# Patient Record
Sex: FEMALE | Race: WHITE | NOT HISPANIC OR LATINO | Employment: UNEMPLOYED | URBAN - METROPOLITAN AREA
[De-identification: names, ages, dates, MRNs, and addresses within clinical notes are randomized per-mention and may not be internally consistent; named-entity substitution may affect disease eponyms.]

---

## 2024-04-30 ENCOUNTER — OFFICE VISIT (OUTPATIENT)
Age: 39
End: 2024-04-30
Payer: COMMERCIAL

## 2024-04-30 VITALS
WEIGHT: 127 LBS | SYSTOLIC BLOOD PRESSURE: 112 MMHG | BODY MASS INDEX: 23.37 KG/M2 | HEIGHT: 62 IN | DIASTOLIC BLOOD PRESSURE: 72 MMHG

## 2024-04-30 DIAGNOSIS — I87.2 VENOUS (PERIPHERAL) INSUFFICIENCY: ICD-10-CM

## 2024-04-30 DIAGNOSIS — I83.813 VARICOSE VEINS OF BILATERAL LOWER EXTREMITIES WITH PAIN: Primary | ICD-10-CM

## 2024-04-30 PROCEDURE — 99203 OFFICE O/P NEW LOW 30 MIN: CPT | Performed by: SURGERY

## 2024-04-30 NOTE — PROGRESS NOTES
"Chief Complaint  New Patient  (Bilateral varicose veins with swelling, pain and aching. )    Subjective        Mary Domingo presents to [facility]  History of Present Illness patient is a 38-year-old female with varicose veins in both lower extremities.  She has had 5 pregnancies and these developed in an earlier pregnancy and worsened after each additional 1.  No history of DVT or superficial thrombophlebitis.  No history compatible with arterial claudication.  She exercises regularly with no problems and no worsening of her varices but increased pain immediately after she finishes exercising.  No previous venous intervention.  There is a family history of varicose veins in her mother, grandmother, and multiple aunts.  Past History:  Medical History: has no past medical history on file.   Surgical History: has no past surgical history on file.   Family History: family history is not on file.   Social History:     (Not in a hospital admission)     Allergies: Patient has no known allergies.   Objective   Vital Signs:  /72   Ht 157.5 cm (62\")   Wt 57.6 kg (127 lb)   BMI 23.23 kg/m²   Estimated body mass index is 23.23 kg/m² as calculated from the following:    Height as of this encounter: 157.5 cm (62\").    Weight as of this encounter: 57.6 kg (127 lb).     BMI is within normal parameters. No other follow-up for BMI required.    Mary Domingo has never used tobacco products.        Physical Exam varicose veins noted in the medial calf region bilaterally as large as 1-1/2 cm in diameter.  They appear to be originating off of the greater saphenous veins.  On the right there reticular veins crossing the patella to the lateral knee region about 8 to 9 mm in diameter.  No edema noted.  Good pulses noted in both lower extremities.  Result Review :                     Assessment and Plan     Diagnoses and all orders for this visit:    1. Varicose veins of bilateral lower " extremities with pain (Primary)  -     Venous w Reflux Lower Extremity - Bilateral CAR; Future    2. Venous (peripheral) insufficiency    The pathophysiology of venous insufficiency and varicose veins were discussed in detail with the patient.  Treatment options were covered.  She will be placed in compression stockings of 20 to 30 mmHg.  Will obtain a venous scan with reflux and mapping.  Further recommendations to follow the testing.  I briefly discussed the possibility of venous ablations as a treatment for her if indicated with significant superficial insufficiency on her venous scan.  She understands if there is a combination of deep venous insufficiency and superficial insufficiency it may also include long-term compression stocking wear for the deep system management.         Follow Up     Return in about 4 weeks (around 5/28/2024) for After venous scan with reflux and mapping.  Patient was given instructions and counseling regarding her condition or for health maintenance advice. Please see specific information pulled into the AVS if appropriate.

## 2024-08-28 NOTE — PROGRESS NOTES
"Chief Complaint  No chief complaint on file.    Subjective        Mary Doimngo presents to [facility]ASC[facility]  History of Present Illness  The patient is a 38-year-old female with varicose veins in both lower extremities.  She has had 5 pregnancies and these developed in her initial pregnancy and worsened after each additional one.  No history of DVT or superficial thrombophlebitis.  No history compatible with arterial claudication.  She exercises regularly with no problems and no worsening of her varices but increased pain immediately after she finishes exercising.  The discomfort has an effect on activities of daily living.  No previous venous intervention.  There is a family history of varicose veins in her mother, grandmother, and multiple aunts.  She was placed in compression stockings of 20 to 30 mmHg and has not seen any significant benefit with this.  She returned on September 3, 2024 with a venous scan with reflux and mapping.  This demonstrated significant reflux at the saphenofemoral junction and involving the greater saphenous veins, anterior accessory saphenous vein, and short saphenous veins bilaterally.    Past History:  Medical History: has no past medical history on file.   Surgical History: has no past surgical history on file.   Family History: family history is not on file.   Social History:     (Not in a hospital admission)     Allergies: Patient has no known allergies.   Objective   Vital Signs:  There were no vitals taken for this visit.  Estimated body mass index is 23.23 kg/m² as calculated from the following:    Height as of 4/30/24: 157.5 cm (62\").    Weight as of 4/30/24: 57.6 kg (127 lb).     BMI is within normal parameters. No other follow-up for BMI required.    Mary Domingo has never smoked and has never used smokeless tobacco.        Physical Exam   varicose veins noted in the medial calf region bilaterally as large as 1-1/2 cm in diameter. They appear to " be originating off of the greater saphenous veins. On the right there reticular veins crossing the patella to the lateral knee region about 8 to 9 mm in diameter. No edema noted. Good pulses noted in both lower extremities.   Result Review :        Data reviewed : Venous scan of reflux and mapping from September 3, 2024, findings as above.             Assessment and Plan     Diagnoses and all orders for this visit:    1. Varicose veins of bilateral lower extremities with pain (Primary)    2. Venous (peripheral) insufficiency    The patient has significant reflux involving the greater saphenous veins, anterior accessory saphenous veins, and short saphenous veins with varices off all of these structures.  I believe if we were to perform ablations of all of these vessels bilaterally that she would have significant improvement in her pain but also decompression of her varices.  The procedure and its risk and benefits have been discussed in detail with her.  She is in agreement with the plan.  We will contact insurance for approval.         Follow Up     No follow-ups on file.  Patient was given instructions and counseling regarding her condition or for health maintenance advice. Please see specific information pulled into the AVS if appropriate.

## 2024-09-03 ENCOUNTER — HOSPITAL ENCOUNTER (OUTPATIENT)
Facility: HOSPITAL | Age: 39
Discharge: HOME | End: 2024-09-03
Admitting: SURGERY
Payer: COMMERCIAL

## 2024-09-03 ENCOUNTER — OFFICE VISIT (OUTPATIENT)
Age: 39
End: 2024-09-03
Payer: COMMERCIAL

## 2024-09-03 VITALS — HEIGHT: 62 IN | WEIGHT: 127 LBS | BODY MASS INDEX: 23.37 KG/M2

## 2024-09-03 DIAGNOSIS — I83.813 VARICOSE VEINS OF BILATERAL LOWER EXTREMITIES WITH PAIN: Primary | ICD-10-CM

## 2024-09-03 DIAGNOSIS — I83.813 VARICOSE VEINS OF BILATERAL LOWER EXTREMITIES WITH PAIN: ICD-10-CM

## 2024-09-03 DIAGNOSIS — I87.2 VENOUS (PERIPHERAL) INSUFFICIENCY: ICD-10-CM

## 2024-09-03 LAB
BH CV LEFT LOWER VAS AA GSV REFLUX TIME: 4.82 SEC
BH CV LEFT LOWER VAS AA GSV TRANS DIAMETER: 0.52 CM
BH CV LEFT LOWER VAS EXT ILIAC REFLUX COLOR FLOW TIME: 1.4 SEC
BH CV LEFT LOWER VAS GSV KNEE REFLUX TIME: 8.83 SEC
BH CV LEFT LOWER VAS GSV KNEE TRANSVERSE DIAMETER: 0.89 CM
BH CV LEFT LOWER VAS GSV MID CALF TRANS DIAMETER: 0.42 CM
BH CV LEFT LOWER VAS GSV MID TRANSVERSE DIAMETER: 0.47 CM
BH CV LEFT LOWER VAS GSV PROX REFLUX TIME: 3.3 SEC
BH CV LEFT LOWER VAS GSV PROX TRANSVERSE DIAMETER: 0.81 CM
BH CV LEFT LOWER VAS GSVBELOW KNEE TRANSVERSE DIAMETER: 0.54 CM
BH CV LEFT LOWER VAS MID FEMORAL REFLUX TIME: 1.51 SEC
BH CV LEFT LOWER VAS POPLITEAL REFLUX TIME: 1.01 SEC
BH CV LEFT LOWER VAS SAPHENOFEMORAL JUNCTION REFLUX TIME: 2.38 SEC
BH CV LEFT LOWER VAS SAPHENOFEMORAL JUNCTION TRANSVERSE DIAMETER: 0.98 CM
BH CV LEFT LOWER VAS SPJ TRANS DIAMETER: 0.56 CM
BH CV LEFT LOWER VAS SSV MID CALF REFLUX TIME: 11.6 SEC
BH CV LEFT LOWER VAS SSV MID CALF TRANS DIAMETER: 0.5 CM
BH CV LEFT LOWER VAS SSV PROX CALF TRANS DIAMETER: 0.63 CM
BH CV LOW VAS LEFT EXTERNAL ILIAC AUGMENT: NORMAL
BH CV LOW VAS LEFT EXTERNAL ILIAC COMPRESS: NORMAL
BH CV LOWER VAS LEFT GSV DIST THIGH COMPRESSIBILTY: NORMAL
BH CV LOWER VAS LEFT GSV MID CALF COMPRESSIBILTY: NORMAL
BH CV LOWER VAS LEFT GSV MID THIGH COMPRESSIBILTY: NORMAL
BH CV LOWER VAS RIGHT GSV DIST THIGH COMPRESSIBILTY: NORMAL
BH CV LOWER VAS RIGHT GSV MID CALF COMPRESSIBILTY: NORMAL
BH CV LOWER VAS RIGHT GSV MID THIGH COMPRESSIBILTY: NORMAL
BH CV LOWER VASCULAR LEFT AA GSV COMPETENT: NORMAL
BH CV LOWER VASCULAR LEFT AA GSV COMPRESS: NORMAL
BH CV LOWER VASCULAR LEFT COMMON FEMORAL AUGMENT: NORMAL
BH CV LOWER VASCULAR LEFT COMMON FEMORAL COMPETENT: NORMAL
BH CV LOWER VASCULAR LEFT COMMON FEMORAL COMPRESS: NORMAL
BH CV LOWER VASCULAR LEFT COMMON FEMORAL PHASIC: NORMAL
BH CV LOWER VASCULAR LEFT COMMON FEMORAL SPONT: NORMAL
BH CV LOWER VASCULAR LEFT DISTAL FEMORAL COMPRESS: NORMAL
BH CV LOWER VASCULAR LEFT EXTERNAL ILIAC COMPETENT: NORMAL
BH CV LOWER VASCULAR LEFT EXTERNAL ILIAC PHASIC: NORMAL
BH CV LOWER VASCULAR LEFT EXTERNAL ILIAC SPONT: NORMAL
BH CV LOWER VASCULAR LEFT GASTRONEMIUS COMPRESS: NORMAL
BH CV LOWER VASCULAR LEFT GREATER SAPH AK COMPETENT: NORMAL
BH CV LOWER VASCULAR LEFT GREATER SAPH AK COMPRESS: NORMAL
BH CV LOWER VASCULAR LEFT GREATER SAPH BK COMPRESS: NORMAL
BH CV LOWER VASCULAR LEFT GSV DIST THIGH COMPETENT: NORMAL
BH CV LOWER VASCULAR LEFT LESSER SAPH COMPETENT: NORMAL
BH CV LOWER VASCULAR LEFT LESSER SAPH COMPRESS: NORMAL
BH CV LOWER VASCULAR LEFT MID FEMORAL AUGMENT: NORMAL
BH CV LOWER VASCULAR LEFT MID FEMORAL COMPETENT: NORMAL
BH CV LOWER VASCULAR LEFT MID FEMORAL COMPRESS: NORMAL
BH CV LOWER VASCULAR LEFT MID FEMORAL PHASIC: NORMAL
BH CV LOWER VASCULAR LEFT MID FEMORAL SPONT: NORMAL
BH CV LOWER VASCULAR LEFT PERONEAL COMPRESS: NORMAL
BH CV LOWER VASCULAR LEFT POPLITEAL AUGMENT: NORMAL
BH CV LOWER VASCULAR LEFT POPLITEAL COMPETENT: NORMAL
BH CV LOWER VASCULAR LEFT POPLITEAL COMPRESS: NORMAL
BH CV LOWER VASCULAR LEFT POPLITEAL PHASIC: NORMAL
BH CV LOWER VASCULAR LEFT POPLITEAL SPONT: NORMAL
BH CV LOWER VASCULAR LEFT POSTERIOR TIBIAL COMPRESS: NORMAL
BH CV LOWER VASCULAR LEFT PROFUNDA FEMORAL COMPRESS: NORMAL
BH CV LOWER VASCULAR LEFT PROXIMAL FEMORAL COMPRESS: NORMAL
BH CV LOWER VASCULAR LEFT SAPHENOFEMORAL JUNCTION AUGMENT: NORMAL
BH CV LOWER VASCULAR LEFT SAPHENOFEMORAL JUNCTION COMPETENT: NORMAL
BH CV LOWER VASCULAR LEFT SAPHENOFEMORAL JUNCTION COMPRESS: NORMAL
BH CV LOWER VASCULAR LEFT SAPHENOFEMORAL JUNCTION PHASIC: NORMAL
BH CV LOWER VASCULAR LEFT SAPHENOFEMORAL JUNCTION SPONT: NORMAL
BH CV LOWER VASCULAR LEFT SAPHENOPOP JX AUGMENT: NORMAL
BH CV LOWER VASCULAR LEFT SAPHENOPOP JX COMPETENT: NORMAL
BH CV LOWER VASCULAR LEFT SAPHENOPOP JX COMPRESS: NORMAL
BH CV LOWER VASCULAR LEFT SAPHENOPOP JX PHASIC: NORMAL
BH CV LOWER VASCULAR LEFT SAPHENOPOP JX SPONT: NORMAL
BH CV LOWER VASCULAR LEFT SOLEAL COMPRESS: NORMAL
BH CV LOWER VASCULAR LEFT SSV MID CALF COMPETENT: NORMAL
BH CV LOWER VASCULAR LEFT SSV MID CALF COMPRESS: NORMAL
BH CV LOWER VASCULAR RIGHT AA GSV COMPETENT: NORMAL
BH CV LOWER VASCULAR RIGHT AA GSV COMPRESS: NORMAL
BH CV LOWER VASCULAR RIGHT COMMON FEMORAL AUGMENT: NORMAL
BH CV LOWER VASCULAR RIGHT COMMON FEMORAL COMPETENT: NORMAL
BH CV LOWER VASCULAR RIGHT COMMON FEMORAL COMPRESS: NORMAL
BH CV LOWER VASCULAR RIGHT COMMON FEMORAL PHASIC: NORMAL
BH CV LOWER VASCULAR RIGHT COMMON FEMORAL SPONT: NORMAL
BH CV LOWER VASCULAR RIGHT DISTAL FEMORAL COMPRESS: NORMAL
BH CV LOWER VASCULAR RIGHT EXTERNAL ILIAC AUGMENT: NORMAL
BH CV LOWER VASCULAR RIGHT EXTERNAL ILIAC COMPETENT: NORMAL
BH CV LOWER VASCULAR RIGHT EXTERNAL ILIAC COMPRESS: NORMAL
BH CV LOWER VASCULAR RIGHT EXTERNAL ILIAC PHASIC: NORMAL
BH CV LOWER VASCULAR RIGHT EXTERNAL ILIAC SPONT: NORMAL
BH CV LOWER VASCULAR RIGHT GASTRONEMIUS COMPRESS: NORMAL
BH CV LOWER VASCULAR RIGHT GREATER SAPH AK COMPETENT: NORMAL
BH CV LOWER VASCULAR RIGHT GREATER SAPH BK COMPRESS: NORMAL
BH CV LOWER VASCULAR RIGHT GSV DIST THIGH COMPETENT: NORMAL
BH CV LOWER VASCULAR RIGHT LESSER SAPH COMPETENT: NORMAL
BH CV LOWER VASCULAR RIGHT LESSER SAPH COMPRESS: NORMAL
BH CV LOWER VASCULAR RIGHT MID FEMORAL AUGMENT: NORMAL
BH CV LOWER VASCULAR RIGHT MID FEMORAL COMPETENT: NORMAL
BH CV LOWER VASCULAR RIGHT MID FEMORAL COMPRESS: NORMAL
BH CV LOWER VASCULAR RIGHT MID FEMORAL PHASIC: NORMAL
BH CV LOWER VASCULAR RIGHT MID FEMORAL SPONT: NORMAL
BH CV LOWER VASCULAR RIGHT PERONEAL COMPRESS: NORMAL
BH CV LOWER VASCULAR RIGHT POPLITEAL AUGMENT: NORMAL
BH CV LOWER VASCULAR RIGHT POPLITEAL COMPETENT: NORMAL
BH CV LOWER VASCULAR RIGHT POPLITEAL COMPRESS: NORMAL
BH CV LOWER VASCULAR RIGHT POPLITEAL PHASIC: NORMAL
BH CV LOWER VASCULAR RIGHT POPLITEAL SPONT: NORMAL
BH CV LOWER VASCULAR RIGHT POSTERIOR TIBIAL COMPRESS: NORMAL
BH CV LOWER VASCULAR RIGHT PROFUNDA FEMORAL COMPRESS: NORMAL
BH CV LOWER VASCULAR RIGHT PROXIMAL FEMORAL COMPRESS: NORMAL
BH CV LOWER VASCULAR RIGHT SAPHENOFEMORAL JUNCTION AUGMENT: NORMAL
BH CV LOWER VASCULAR RIGHT SAPHENOFEMORAL JUNCTION COMPETENT: NORMAL
BH CV LOWER VASCULAR RIGHT SAPHENOFEMORAL JUNCTION COMPRESS: NORMAL
BH CV LOWER VASCULAR RIGHT SAPHENOFEMORAL JUNCTION PHASIC: NORMAL
BH CV LOWER VASCULAR RIGHT SAPHENOFEMORAL JUNCTION SPONT: NORMAL
BH CV LOWER VASCULAR RIGHT SAPHENOPOP JX AUGMENT: NORMAL
BH CV LOWER VASCULAR RIGHT SAPHENOPOP JX COMPETENT: NORMAL
BH CV LOWER VASCULAR RIGHT SAPHENOPOP JX COMPRESS: NORMAL
BH CV LOWER VASCULAR RIGHT SAPHENOPOP JX PHASIC: NORMAL
BH CV LOWER VASCULAR RIGHT SAPHENOPOP JX SPONT: NORMAL
BH CV LOWER VASCULAR RIGHT SOLEAL COMPRESS: NORMAL
BH CV LOWER VASCULAR RIGHT SSV MID CALF COMPETENT: NORMAL
BH CV LOWER VASCULAR RIGHT SSV MID CALF COMPRESS: NORMAL
BH CV RIGHT LOWER VAS AA GSV REFLUX TIME: 0.54 SEC
BH CV RIGHT LOWER VAS AA GSV TRANS DIAMETER: 0.51 CM
BH CV RIGHT LOWER VAS COMMON FEMORAL REFLUX COLOR FLOW TIME: 5.23 SEC
BH CV RIGHT LOWER VAS EXT ILIAC REFLUX COLOR FLOW TIME: 9.37 SEC
BH CV RIGHT LOWER VAS GSV KNEE REFLUX TIME: 2.95 SEC
BH CV RIGHT LOWER VAS GSV KNEE TRANS DIAMETER: 1.09 CM
BH CV RIGHT LOWER VAS GSV MID CALF TRANS DIAMETER: 0.28 CM
BH CV RIGHT LOWER VAS GSV MID THIGH TRANS DIAMETER: 0.69 CM
BH CV RIGHT LOWER VAS GSV PROX CALF TRANS DIAMETER: 0.67 CM
BH CV RIGHT LOWER VAS GSV PROX THIGH REFLUX TIME: 9.67 SEC
BH CV RIGHT LOWER VAS GSV PROX THIGH TRANS DIAMETER: 0.97 CM
BH CV RIGHT LOWER VAS MID FEMORAL REFLUX TIME: 1.32 SEC
BH CV RIGHT LOWER VAS SAPHENOFEM JUNCTION REFLUX TIME: 7.26 SEC
BH CV RIGHT LOWER VAS SAPHENOFEM JUNCTION TRANSVERSE DIAMETER: 0.8 CM
BH CV RIGHT LOWER VAS SPJ TRANS DIAMETER: 0.57 CM
BH CV RIGHT LOWER VAS SSV MID CALF REFLUX TIME: 10.81 SEC
BH CV RIGHT LOWER VAS SSV MID CALF TRANS DIAMETER: 0.53 CM
BH CV RIGHT LOWER VAS SSV PROX CALF TRANS DIAMETER: 0.52 CM
BH CV VAS RIGHT GSV PROXIMAL HIDDEN LRR COMPRESSIBILTY: NORMAL

## 2024-09-03 PROCEDURE — 99214 OFFICE O/P EST MOD 30 MIN: CPT | Performed by: SURGERY

## 2024-09-03 PROCEDURE — 93970 EXTREMITY STUDY: CPT | Performed by: SURGERY

## 2024-09-03 PROCEDURE — 93970 EXTREMITY STUDY: CPT

## 2024-12-17 ENCOUNTER — HOSPITAL ENCOUNTER (OUTPATIENT)
Facility: HOSPITAL | Age: 39
Discharge: HOME | End: 2024-12-17
Admitting: SURGERY
Payer: COMMERCIAL

## 2024-12-17 ENCOUNTER — OFFICE VISIT (OUTPATIENT)
Age: 39
End: 2024-12-17
Payer: COMMERCIAL

## 2024-12-17 VITALS
HEART RATE: 90 BPM | DIASTOLIC BLOOD PRESSURE: 115 MMHG | WEIGHT: 135 LBS | HEIGHT: 62 IN | SYSTOLIC BLOOD PRESSURE: 205 MMHG | BODY MASS INDEX: 24.84 KG/M2

## 2024-12-17 DIAGNOSIS — I87.2 VENOUS (PERIPHERAL) INSUFFICIENCY: ICD-10-CM

## 2024-12-17 DIAGNOSIS — I87.2 VENOUS (PERIPHERAL) INSUFFICIENCY: Primary | ICD-10-CM

## 2024-12-17 DIAGNOSIS — I83.813 VARICOSE VEINS OF BILATERAL LOWER EXTREMITIES WITH PAIN: ICD-10-CM

## 2024-12-17 DIAGNOSIS — R03.0 ELEVATED BLOOD PRESSURE READING: ICD-10-CM

## 2024-12-17 LAB
BH CV LOW VAS RIGHT ANTERIOR SAPH VESSEL SCRIPT: 1
BH CV LOW VAS RIGHT GREAT SAPH AK CM FIELD: 0.81 CM
BH CV LOW VAS RIGHT GREATER SAPH AK VESSEL: 1
BH CV LOW VAS RIGHT GREATER SAPH BK VESSEL: 1
BH CV LOW VAS RIGHT LESSER SAPH VESSEL: 1
BH CV LOWER VASCULAR RIGHT ANTERIOR SAPH COMPRESS: NORMAL
BH CV LOWER VASCULAR RIGHT COMMON FEMORAL COMPRESS: NORMAL
BH CV LOWER VASCULAR RIGHT DISTAL FEMORAL COMPRESS: NORMAL
BH CV LOWER VASCULAR RIGHT EXTERNAL ILIAC COMPRESS: NORMAL
BH CV LOWER VASCULAR RIGHT GASTRONEMIUS COMPRESS: NORMAL
BH CV LOWER VASCULAR RIGHT GREATER SAPH AK COMPRESS: NORMAL
BH CV LOWER VASCULAR RIGHT GREATER SAPH BK COMPRESS: NORMAL
BH CV LOWER VASCULAR RIGHT LESSER SAPH COMPRESS: NORMAL
BH CV LOWER VASCULAR RIGHT MID FEMORAL COMPRESS: NORMAL
BH CV LOWER VASCULAR RIGHT PERONEAL COMPRESS: NORMAL
BH CV LOWER VASCULAR RIGHT POPLITEAL COMPRESS: NORMAL
BH CV LOWER VASCULAR RIGHT POSTERIOR TIBIAL COMPRESS: NORMAL
BH CV LOWER VASCULAR RIGHT PROFUNDA FEMORAL COMPRESS: NORMAL
BH CV LOWER VASCULAR RIGHT PROXIMAL FEMORAL COMPRESS: NORMAL
BH CV LOWER VASCULAR RIGHT SAPHENOFEMORAL JUNCTION COMPRESS: NORMAL

## 2024-12-17 PROCEDURE — 93971 EXTREMITY STUDY: CPT

## 2024-12-17 PROCEDURE — 99213 OFFICE O/P EST LOW 20 MIN: CPT | Performed by: NURSE PRACTITIONER

## 2024-12-17 NOTE — PROGRESS NOTES
Chief Complaint  Post-op Follow-up    Subjective        Mary Domingo presents to [facility] VASC[facility] SURGERY  HPI   Mary Domingo is a 39 y.o. female that has been followed in our office for venous insufficiency and varicose veins. She under went a right leg endovenous laser ablation on 12/12/2024 with Dr. Villafuerte.  She is having her contralateral leg done on 12/20/2024.  She returns today in follow up along with a spot check. She has been doing well since the procedure with minimal pain. She has been compliant with compression stockings.  Today, her blood pressure is elevated.  She reports she gets very nervous in clinical settings.    Mary Domingo  reports that she has never smoked. She has never used smokeless tobacco..        Objective   Vital Signs:  Vitals:    12/17/24 0824   BP: (!) 205/115   Pulse: 90      Body mass index is 24.68 kg/m².   BMI is within normal parameters. No other follow-up for BMI required.       Physical Exam  Vitals reviewed.   Constitutional:       Appearance: Normal appearance.   HENT:      Head: Normocephalic.   Cardiovascular:      Rate and Rhythm: Normal rate and regular rhythm.      Pulses: Normal pulses.           Dorsalis pedis pulses are 3+ on the right side and 3+ on the left side.        Posterior tibial pulses are 3+ on the right side and 3+ on the left side.   Pulmonary:      Effort: Pulmonary effort is normal.   Skin:     General: Skin is warm.   Neurological:      General: No focal deficit present.      Mental Status: She is alert and oriented to person, place, and time.   Psychiatric:         Mood and Affect: Mood normal.          Result Review :      Spot Check: Duplex Venous Lower Extremity - Right CAR (12/17/2024 08:17)                    Assessment and Plan     Diagnoses and all orders for this visit:    1. Venous (peripheral) insufficiency (Primary)  -     Duplex Venous Lower Extremity - Bilateral CAR; Future             Patient is doing well status  post endovenous laser ablation.  Today, her spotcheck shows a successful ablation with no DVT.  We discussed that it will take time for these veins to decompress.  We discussed postoperative care instructions including wearing stockings for a total of 2 weeks. Maxine resume exercise in 2 weeks.  She is having her contralateral leg done on 12/20/2024.  We will plan to check a bilateral venous duplex approximately 3 months after that.  I have recommended that she purchase a blood pressure cuff to check her blood pressure at home and if it remains elevated, to contact her primary care provider.  Follow Up     Return in about 3 months (around 3/17/2025) for LEV; see SMG at the vein office .      Nettie Gary, DANIEL       Answers submitted by the patient for this visit:  Primary Reason for Visit (Submitted on 12/15/2024)  What is the primary reason for your visit?: Problem Not Listed

## 2024-12-27 ENCOUNTER — HOSPITAL ENCOUNTER (OUTPATIENT)
Facility: HOSPITAL | Age: 39
Discharge: HOME | End: 2024-12-27
Admitting: SURGERY
Payer: COMMERCIAL

## 2024-12-27 DIAGNOSIS — I87.2 VENOUS (PERIPHERAL) INSUFFICIENCY: ICD-10-CM

## 2024-12-27 DIAGNOSIS — I83.813 VARICOSE VEINS OF BILATERAL LOWER EXTREMITIES WITH PAIN: ICD-10-CM

## 2024-12-27 LAB
BH CV LOW VAS LEFT EXTERNAL ILIAC AUGMENT: NORMAL
BH CV LOW VAS LEFT EXTERNAL ILIAC COMPRESS: NORMAL
BH CV LOW VAS LEFT GREAT SAPH AK CM FIELD: 1.6 CM
BH CV LOW VAS LEFT GREATER SAPH AK VESSEL: 1
BH CV LOW VAS LEFT GREATER SAPH BK VESSEL: 1
BH CV LOW VAS LEFT LESSER SAPH VESSEL: 1
BH CV LOW VAS LEFT VARICOSITY BK VESSEL: 1
BH CV LOWER VASCULAR LEFT ANTERIOR SAPH COMPRESS: NORMAL
BH CV LOWER VASCULAR LEFT ANTERIOR SAPH VESSEL SCRIPT: 1
BH CV LOWER VASCULAR LEFT COMMON FEMORAL AUGMENT: NORMAL
BH CV LOWER VASCULAR LEFT COMMON FEMORAL COMPRESS: NORMAL
BH CV LOWER VASCULAR LEFT DISTAL FEMORAL COMPRESS: NORMAL
BH CV LOWER VASCULAR LEFT GASTRONEMIUS COMPRESS: NORMAL
BH CV LOWER VASCULAR LEFT GREATER SAPH AK COMPRESS: NORMAL
BH CV LOWER VASCULAR LEFT GREATER SAPH BK COMPRESS: NORMAL
BH CV LOWER VASCULAR LEFT LESSER SAPH COMPRESS: NORMAL
BH CV LOWER VASCULAR LEFT MID FEMORAL COMPRESS: NORMAL
BH CV LOWER VASCULAR LEFT PERONEAL COMPRESS: NORMAL
BH CV LOWER VASCULAR LEFT POPLITEAL COMPRESS: NORMAL
BH CV LOWER VASCULAR LEFT POPLITEAL PHASIC: NORMAL
BH CV LOWER VASCULAR LEFT POSTERIOR TIBIAL COMPRESS: NORMAL
BH CV LOWER VASCULAR LEFT PROFUNDA FEMORAL COMPRESS: NORMAL
BH CV LOWER VASCULAR LEFT PROXIMAL FEMORAL COMPRESS: NORMAL
BH CV LOWER VASCULAR LEFT SAPHENOFEMORAL JUNCTION COMPRESS: NORMAL
BH CV LOWER VASCULAR LEFT VARICOSITY BK COMPRESS: NORMAL

## 2024-12-27 PROCEDURE — 93971 EXTREMITY STUDY: CPT

## 2025-03-18 NOTE — PROGRESS NOTES
"Chief Complaint  Varicose Veins (3 month follow up LEV)  Follow-up after bilateral venous ablative procedures    Subjective        Mary Domingo presents to [facility]ASC[facility]  History of Present Illness the patient is status post laser ablations bilaterally, the right greater saphenous vein, anterior accessory saphenous vein, and short saphenous veins on December 12, 2024 and the left greater saphenous vein and short saphenous veins on December 20, 2024.  Postoperative scans demonstrated successful ablations bilaterally.  She returned on March 25, 2025 in follow-up with a venous scan.  This demonstrated continued ablation of the greater and short saphenous veins bilaterally and anterior accessory saphenous vein on the right.  Overall her legs feel dramatically better to her.  Still a few varices present on the right in the medial calf.    Past History:  Medical History: has a past medical history of Hypertension.   Surgical History: has a past surgical history that includes Varicose vein surgery (Right, 12/12/2024).   Family History: family history includes Hypertension in her father; Miscarriages / Stillbirths in her sister and sister.   Social History: reports that she has never smoked. She has never been exposed to tobacco smoke. She has never used smokeless tobacco. She reports that she does not drink alcohol and does not use drugs.    (Not in a hospital admission)     Allergies: Patient has no known allergies.   Objective   Vital Signs:  BP (!) 172/112 (BP Location: Right arm) Comment: Patient states she has white coat syndrome  Ht 157.5 cm (62.01\")   Wt 59.7 kg (131 lb 11.2 oz)   BMI 24.08 kg/m²   Estimated body mass index is 24.08 kg/m² as calculated from the following:    Height as of this encounter: 157.5 cm (62.01\").    Weight as of this encounter: 59.7 kg (131 lb 11.2 oz).     BMI is within normal parameters. No other follow-up for BMI required.    Mary Domingo  reports " that she has never smoked. She has never been exposed to tobacco smoke. She has never used smokeless tobacco.         Physical Exam marked decompression of varices bilaterally, near total decompression on the left but persistent varices in the right calf about 50 to 60% decreased in size.  Result Review :        Data reviewed : Venous scans from December 17, 2024, December 27, 2024, and March 25, 2025, as described above             Assessment and Plan     Diagnoses and all orders for this visit:    1. Varicose veins of bilateral lower extremities with pain (Primary)    2. Venous (peripheral) insufficiency    Overall, she has had a very nice result and she is very pleased with how well her legs feel.  Still some pain in the varices in the calf region.  She has not reached that point of maximal medical benefit and we will give her a couple more months to see if there is further decompression of the varices on the right.  If not, she may be a candidate for chemical ablation with Varithena.  I discussed this briefly with her and the risks and benefits.  She is eager to wait and see if there is further decompression.         Follow Up     Return in about 2 months (around 5/25/2025).  Patient was given instructions and counseling regarding her condition or for health maintenance advice. Please see specific information pulled into the AVS if appropriate.

## 2025-03-25 ENCOUNTER — HOSPITAL ENCOUNTER (OUTPATIENT)
Facility: HOSPITAL | Age: 40
Discharge: HOME | End: 2025-03-25
Admitting: NURSE PRACTITIONER
Payer: COMMERCIAL

## 2025-03-25 ENCOUNTER — OFFICE VISIT (OUTPATIENT)
Age: 40
End: 2025-03-25
Payer: COMMERCIAL

## 2025-03-25 VITALS
DIASTOLIC BLOOD PRESSURE: 112 MMHG | HEIGHT: 62 IN | BODY MASS INDEX: 24.24 KG/M2 | WEIGHT: 131.7 LBS | SYSTOLIC BLOOD PRESSURE: 172 MMHG

## 2025-03-25 DIAGNOSIS — I83.813 VARICOSE VEINS OF BILATERAL LOWER EXTREMITIES WITH PAIN: Primary | ICD-10-CM

## 2025-03-25 DIAGNOSIS — I87.2 VENOUS (PERIPHERAL) INSUFFICIENCY: ICD-10-CM

## 2025-03-25 LAB
BH CV LOW VAS LEFT EXTERNAL ILIAC AUGMENT: NORMAL
BH CV LOW VAS LEFT EXTERNAL ILIAC COMPRESS: NORMAL
BH CV LOW VAS LEFT GREATER SAPH AK VESSEL: 1
BH CV LOW VAS LEFT GREATER SAPH BK VESSEL: 1
BH CV LOW VAS LEFT LESSER SAPH VESSEL: 1
BH CV LOW VAS RIGHT ANTERIOR SAPH VESSEL SCRIPT: 1
BH CV LOW VAS RIGHT GREATER SAPH AK VESSEL: 1
BH CV LOW VAS RIGHT GREATER SAPH BK VESSEL: 1
BH CV LOW VAS RIGHT LESSER SAPH VESSEL: 1
BH CV LOWER VASCULAR LEFT ANTERIOR SAPH COMPRESS: NORMAL
BH CV LOWER VASCULAR LEFT COMMON FEMORAL AUGMENT: NORMAL
BH CV LOWER VASCULAR LEFT COMMON FEMORAL COMPETENT: NORMAL
BH CV LOWER VASCULAR LEFT COMMON FEMORAL COMPRESS: NORMAL
BH CV LOWER VASCULAR LEFT COMMON FEMORAL PHASIC: NORMAL
BH CV LOWER VASCULAR LEFT COMMON FEMORAL SPONT: NORMAL
BH CV LOWER VASCULAR LEFT DISTAL FEMORAL COMPRESS: NORMAL
BH CV LOWER VASCULAR LEFT EXTERNAL ILIAC COMPETENT: NORMAL
BH CV LOWER VASCULAR LEFT EXTERNAL ILIAC PHASIC: NORMAL
BH CV LOWER VASCULAR LEFT EXTERNAL ILIAC SPONT: NORMAL
BH CV LOWER VASCULAR LEFT GASTRONEMIUS COMPRESS: NORMAL
BH CV LOWER VASCULAR LEFT GREATER SAPH AK COMPRESS: NORMAL
BH CV LOWER VASCULAR LEFT GREATER SAPH BK COMPRESS: NORMAL
BH CV LOWER VASCULAR LEFT LESSER SAPH COMPRESS: NORMAL
BH CV LOWER VASCULAR LEFT MID FEMORAL AUGMENT: NORMAL
BH CV LOWER VASCULAR LEFT MID FEMORAL COMPETENT: NORMAL
BH CV LOWER VASCULAR LEFT MID FEMORAL COMPRESS: NORMAL
BH CV LOWER VASCULAR LEFT MID FEMORAL PHASIC: NORMAL
BH CV LOWER VASCULAR LEFT MID FEMORAL SPONT: NORMAL
BH CV LOWER VASCULAR LEFT PERONEAL COMPRESS: NORMAL
BH CV LOWER VASCULAR LEFT POPLITEAL AUGMENT: NORMAL
BH CV LOWER VASCULAR LEFT POPLITEAL COMPETENT: NORMAL
BH CV LOWER VASCULAR LEFT POPLITEAL COMPRESS: NORMAL
BH CV LOWER VASCULAR LEFT POPLITEAL PHASIC: NORMAL
BH CV LOWER VASCULAR LEFT POPLITEAL SPONT: NORMAL
BH CV LOWER VASCULAR LEFT POSTERIOR TIBIAL COMPRESS: NORMAL
BH CV LOWER VASCULAR LEFT PROFUNDA FEMORAL COMPRESS: NORMAL
BH CV LOWER VASCULAR LEFT PROXIMAL FEMORAL COMPRESS: NORMAL
BH CV LOWER VASCULAR LEFT SAPHENOFEMORAL JUNCTION COMPRESS: NORMAL
BH CV LOWER VASCULAR LEFT SOLEAL COMPRESS: NORMAL
BH CV LOWER VASCULAR LEFT VARICOSITY BK COMPETENT: NORMAL
BH CV LOWER VASCULAR LEFT VARICOSITY BK COMPRESS: NORMAL
BH CV LOWER VASCULAR RIGHT ANTERIOR SAPH COMPRESS: NORMAL
BH CV LOWER VASCULAR RIGHT COMMON FEMORAL AUGMENT: NORMAL
BH CV LOWER VASCULAR RIGHT COMMON FEMORAL COMPETENT: NORMAL
BH CV LOWER VASCULAR RIGHT COMMON FEMORAL COMPRESS: NORMAL
BH CV LOWER VASCULAR RIGHT COMMON FEMORAL PHASIC: NORMAL
BH CV LOWER VASCULAR RIGHT COMMON FEMORAL SPONT: NORMAL
BH CV LOWER VASCULAR RIGHT DISTAL FEMORAL COMPRESS: NORMAL
BH CV LOWER VASCULAR RIGHT EXTERNAL ILIAC AUGMENT: NORMAL
BH CV LOWER VASCULAR RIGHT EXTERNAL ILIAC COMPETENT: NORMAL
BH CV LOWER VASCULAR RIGHT EXTERNAL ILIAC COMPRESS: NORMAL
BH CV LOWER VASCULAR RIGHT EXTERNAL ILIAC PHASIC: NORMAL
BH CV LOWER VASCULAR RIGHT EXTERNAL ILIAC SPONT: NORMAL
BH CV LOWER VASCULAR RIGHT GASTRONEMIUS COMPRESS: NORMAL
BH CV LOWER VASCULAR RIGHT GREATER SAPH AK COMPRESS: NORMAL
BH CV LOWER VASCULAR RIGHT GREATER SAPH BK COMPRESS: NORMAL
BH CV LOWER VASCULAR RIGHT LESSER SAPH COMPRESS: NORMAL
BH CV LOWER VASCULAR RIGHT MID FEMORAL AUGMENT: NORMAL
BH CV LOWER VASCULAR RIGHT MID FEMORAL COMPETENT: NORMAL
BH CV LOWER VASCULAR RIGHT MID FEMORAL COMPRESS: NORMAL
BH CV LOWER VASCULAR RIGHT MID FEMORAL PHASIC: NORMAL
BH CV LOWER VASCULAR RIGHT MID FEMORAL SPONT: NORMAL
BH CV LOWER VASCULAR RIGHT PERONEAL COMPRESS: NORMAL
BH CV LOWER VASCULAR RIGHT POPLITEAL AUGMENT: NORMAL
BH CV LOWER VASCULAR RIGHT POPLITEAL COMPETENT: NORMAL
BH CV LOWER VASCULAR RIGHT POPLITEAL COMPRESS: NORMAL
BH CV LOWER VASCULAR RIGHT POPLITEAL PHASIC: NORMAL
BH CV LOWER VASCULAR RIGHT POPLITEAL SPONT: NORMAL
BH CV LOWER VASCULAR RIGHT POSTERIOR TIBIAL COMPRESS: NORMAL
BH CV LOWER VASCULAR RIGHT PROFUNDA FEMORAL COMPRESS: NORMAL
BH CV LOWER VASCULAR RIGHT PROXIMAL FEMORAL COMPRESS: NORMAL
BH CV LOWER VASCULAR RIGHT SAPHENOFEMORAL JUNCTION COMPRESS: NORMAL
BH CV LOWER VASCULAR RIGHT SOLEAL COMPRESS: NORMAL
BH CV LOWER VASCULAR RIGHT VARICOSITY BK COMPETENT: NORMAL
BH CV LOWER VASCULAR RIGHT VARICOSITY BK COMPRESS: NORMAL

## 2025-03-25 PROCEDURE — 93970 EXTREMITY STUDY: CPT | Performed by: SURGERY

## 2025-03-25 PROCEDURE — 93970 EXTREMITY STUDY: CPT

## 2025-03-25 PROCEDURE — 99213 OFFICE O/P EST LOW 20 MIN: CPT | Performed by: SURGERY

## 2025-06-23 NOTE — PROGRESS NOTES
"Chief Complaint  No chief complaint on file.  Follow-up after venous ablative procedures    Subjective        Mary Domingo presents to [facility]ASC[facility]  History of Present Illness   Ms Domingo is status post laser ablations bilaterally, the right greater saphenous vein, anterior accessory saphenous vein, and short saphenous veins on December 12, 2024 and the left greater saphenous vein and short saphenous veins on December 20, 2024.  Postoperative scans demonstrated successful ablations bilaterally.  She returned on March 25, 2025 in follow-up with a venous scan.  This demonstrated continued ablation of the greater and short saphenous veins bilaterally and anterior saphenous vein on the right.  Overall her legs feel better to her, left much more so than the right.  Still a few varices present on the right in the medial calf that remain uncomfortable.  She returned on July 1, 2025 in follow-up.  No further decompression of varices on the right with ongoing discomfort.     Past History:  Medical History: has a past medical history of Hypertension.   Surgical History: has a past surgical history that includes Varicose vein surgery (Right, 12/12/2024).   Family History: family history includes Hypertension in her father; Miscarriages / Stillbirths in her sister and sister.   Social History: reports that she has never smoked. She has never been exposed to tobacco smoke. She has never used smokeless tobacco. She reports that she does not drink alcohol and does not use drugs.    (Not in a hospital admission)     Allergies: Patient has no known allergies.   Objective   Vital Signs:  There were no vitals taken for this visit.  Estimated body mass index is 24.08 kg/m² as calculated from the following:    Height as of 3/25/25: 157.5 cm (62.01\").    Weight as of 3/25/25: 59.7 kg (131 lb 11.2 oz).     BMI is within normal parameters. No other follow-up for BMI required.    Mary Domingo  reports that " she has never smoked. She has never been exposed to tobacco smoke. She has never used smokeless tobacco.          Physical Exam varices in the right medial calf region and distal thigh measuring 1.2 cm in diameter.  1 branch coursing posteriorly to the mid calf, same size.  Result Review :                     Assessment and Plan     Diagnoses and all orders for this visit:    1. Varicose veins of bilateral lower extremities with pain (Primary)    2. Venous (peripheral) insufficiency    She has had significant improvement in her symptoms on the left with marked decompression of her varices.  Only modest decompression of varices on the right and still ongoing symptoms.  I will discuss further treatment options with her since she is gone 6 months from the time of her ablation.  Options include phlebectomies performed under general anesthetic in an operating room versus chemical ablation with Varithena.  The risks and benefits of venous ablative procedures were discussed in detail with the patient.  Laser ablation, VenaSeal chemical ablation, chemical ablation with Varithena, open phlebectomy under general anesthetic were discussed including the risks and benefits.  Risks include, but not limited to:  bleeding, infection, allergic reaction to a chemical agent utilized (when used), general anesthetic risks (when used), DVT, pulmonary embolism, and death.  Even though these risks are very small, they are real.  She would like to proceed with chemical ablation with Varithena.  We will contact insurance for approval.             Follow Up     No follow-ups on file.  Patient was given instructions and counseling regarding her condition or for health maintenance advice. Please see specific information pulled into the AVS if appropriate.

## 2025-07-01 ENCOUNTER — OFFICE VISIT (OUTPATIENT)
Age: 40
End: 2025-07-01
Payer: COMMERCIAL

## 2025-07-01 VITALS
HEIGHT: 62 IN | WEIGHT: 131 LBS | DIASTOLIC BLOOD PRESSURE: 113 MMHG | HEART RATE: 93 BPM | BODY MASS INDEX: 24.11 KG/M2 | RESPIRATION RATE: 17 BRPM | SYSTOLIC BLOOD PRESSURE: 169 MMHG

## 2025-07-01 DIAGNOSIS — I87.2 VENOUS (PERIPHERAL) INSUFFICIENCY: ICD-10-CM

## 2025-07-01 DIAGNOSIS — I83.813 VARICOSE VEINS OF BILATERAL LOWER EXTREMITIES WITH PAIN: Primary | ICD-10-CM
